# Patient Record
Sex: MALE | Race: WHITE | URBAN - METROPOLITAN AREA
[De-identification: names, ages, dates, MRNs, and addresses within clinical notes are randomized per-mention and may not be internally consistent; named-entity substitution may affect disease eponyms.]

---

## 2017-01-10 ENCOUNTER — ANESTHESIA EVENT (OUTPATIENT)
Dept: SURGERY | Facility: CLINIC | Age: 47
DRG: 489 | End: 2017-01-10
Payer: COMMERCIAL

## 2017-01-10 NOTE — ANESTHESIA PREPROCEDURE EVALUATION
Anesthesia Evaluation     . Pt has had prior anesthetic. Type: Regional    No history of anesthetic complications     ROS/MED HX    ENT/Pulmonary:  - neg pulmonary ROS     Neurologic:  - neg neurologic ROS     Cardiovascular:  - neg cardiovascular ROS   (+) ----. : . . . :. . No previous cardiac testing       METS/Exercise Tolerance:  >4 METS   Hematologic:  - neg hematologic  ROS       Musculoskeletal:  - neg musculoskeletal ROS       GI/Hepatic:  - neg GI/hepatic ROS       Renal/Genitourinary:  - ROS Renal section negative   (+) Pt has no history of transplant,       Endo:  - neg endo ROS       Psychiatric:     (+) psychiatric history anxiety      Infectious Disease:  - neg infectious disease ROS       Malignancy:      - no malignancy   Other:    (+) No chance of pregnancy C-spine cleared: N/A, no H/O Chronic Pain,no other significant disability              Physical Exam  Normal systems: cardiovascular, pulmonary and dental    Airway   Mallampati: I  TM distance: >3 FB  Neck ROM: full    Dental     Cardiovascular   Rhythm and rate: regular and normal      Pulmonary    breath sounds clear to auscultation                    Anesthesia Plan      History & Physical Review  History and physical reviewed and following examination; no interval change.    ASA Status:  2 .        Plan for General, LMA and Peripheral Nerve Block with Intravenous and Propofol induction. Maintenance will be Inhalation.    PONV prophylaxis:  Ondansetron (or other 5HT-3) and Dexamethasone or Solumedrol       Postoperative Care  Postoperative pain management:  Multi-modal analgesia and Peripheral nerve block (Single Shot).      Consents                          .

## 2017-01-11 ENCOUNTER — ANESTHESIA (OUTPATIENT)
Dept: SURGERY | Facility: CLINIC | Age: 47
DRG: 489 | End: 2017-01-11
Payer: COMMERCIAL

## 2017-01-11 ENCOUNTER — APPOINTMENT (OUTPATIENT)
Dept: GENERAL RADIOLOGY | Facility: CLINIC | Age: 47
DRG: 489 | End: 2017-01-11
Attending: ORTHOPAEDIC SURGERY
Payer: COMMERCIAL

## 2017-01-11 ENCOUNTER — SURGERY (OUTPATIENT)
Age: 47
End: 2017-01-11

## 2017-01-11 PROBLEM — M25.569 KNEE PAIN: Status: ACTIVE | Noted: 2017-01-11

## 2017-01-11 PROCEDURE — 40000278 XR SURGERY CARM FLUORO LESS THAN 5 MIN: Mod: TC

## 2017-01-11 PROCEDURE — C9290 INJ, BUPIVACAINE LIPOSOME: HCPCS

## 2017-01-11 PROCEDURE — 25000125 ZZHC RX 250: Performed by: NURSE ANESTHETIST, CERTIFIED REGISTERED

## 2017-01-11 PROCEDURE — 25800025 ZZH RX 258: Performed by: ANESTHESIOLOGY

## 2017-01-11 PROCEDURE — 25000125 ZZHC RX 250

## 2017-01-11 PROCEDURE — 25000125 ZZHC RX 250: Performed by: ORTHOPAEDIC SURGERY

## 2017-01-11 PROCEDURE — 25000128 H RX IP 250 OP 636

## 2017-01-11 RX ORDER — BUPIVACAINE HYDROCHLORIDE AND EPINEPHRINE 2.5; 5 MG/ML; UG/ML
INJECTION, SOLUTION INFILTRATION; PERINEURAL PRN
Status: DISCONTINUED | OUTPATIENT
Start: 2017-01-11 | End: 2017-01-11

## 2017-01-11 RX ORDER — KETOROLAC TROMETHAMINE 30 MG/ML
INJECTION, SOLUTION INTRAMUSCULAR; INTRAVENOUS PRN
Status: DISCONTINUED | OUTPATIENT
Start: 2017-01-11 | End: 2017-01-11

## 2017-01-11 RX ORDER — DEXAMETHASONE SODIUM PHOSPHATE 4 MG/ML
INJECTION, SOLUTION INTRA-ARTICULAR; INTRALESIONAL; INTRAMUSCULAR; INTRAVENOUS; SOFT TISSUE PRN
Status: DISCONTINUED | OUTPATIENT
Start: 2017-01-11 | End: 2017-01-11

## 2017-01-11 RX ORDER — ONDANSETRON 2 MG/ML
INJECTION INTRAMUSCULAR; INTRAVENOUS PRN
Status: DISCONTINUED | OUTPATIENT
Start: 2017-01-11 | End: 2017-01-11

## 2017-01-11 RX ORDER — EPHEDRINE SULFATE 50 MG/ML
INJECTION, SOLUTION INTRAMUSCULAR; INTRAVENOUS; SUBCUTANEOUS PRN
Status: DISCONTINUED | OUTPATIENT
Start: 2017-01-11 | End: 2017-01-11

## 2017-01-11 RX ORDER — LIDOCAINE HYDROCHLORIDE 20 MG/ML
INJECTION, SOLUTION INFILTRATION; PERINEURAL PRN
Status: DISCONTINUED | OUTPATIENT
Start: 2017-01-11 | End: 2017-01-11

## 2017-01-11 RX ORDER — PROPOFOL 10 MG/ML
INJECTION, EMULSION INTRAVENOUS PRN
Status: DISCONTINUED | OUTPATIENT
Start: 2017-01-11 | End: 2017-01-11

## 2017-01-11 RX ORDER — FENTANYL CITRATE 50 UG/ML
INJECTION, SOLUTION INTRAMUSCULAR; INTRAVENOUS PRN
Status: DISCONTINUED | OUTPATIENT
Start: 2017-01-11 | End: 2017-01-11

## 2017-01-11 RX ADMIN — SODIUM CHLORIDE, POTASSIUM CHLORIDE, SODIUM LACTATE AND CALCIUM CHLORIDE: 600; 310; 30; 20 INJECTION, SOLUTION INTRAVENOUS at 15:04

## 2017-01-11 RX ADMIN — DEXAMETHASONE SODIUM PHOSPHATE 6 MG: 4 INJECTION, SOLUTION INTRAMUSCULAR; INTRAVENOUS at 16:11

## 2017-01-11 RX ADMIN — FENTANYL CITRATE 75 MCG: 50 INJECTION, SOLUTION INTRAMUSCULAR; INTRAVENOUS at 15:11

## 2017-01-11 RX ADMIN — ONDANSETRON 4 MG: 2 INJECTION INTRAMUSCULAR; INTRAVENOUS at 16:11

## 2017-01-11 RX ADMIN — PROPOFOL 180 MG: 10 INJECTION, EMULSION INTRAVENOUS at 14:27

## 2017-01-11 RX ADMIN — BUPIVACAINE 15 ML: 13.3 INJECTION, SUSPENSION, LIPOSOMAL INFILTRATION at 12:32

## 2017-01-11 RX ADMIN — BUPIVACAINE HYDROCHLORIDE AND EPINEPHRINE BITARTRATE 10 ML: 2.5; .005 INJECTION, SOLUTION INFILTRATION; PERINEURAL at 12:32

## 2017-01-11 RX ADMIN — KETOROLAC TROMETHAMINE 30 MG: 30 INJECTION, SOLUTION INTRAMUSCULAR at 16:11

## 2017-01-11 RX ADMIN — CEFAZOLIN SODIUM 1 G: 2 INJECTION, SOLUTION INTRAVENOUS at 16:21

## 2017-01-11 RX ADMIN — LIDOCAINE HYDROCHLORIDE 60 MG: 20 INJECTION, SOLUTION INFILTRATION; PERINEURAL at 14:27

## 2017-01-11 RX ADMIN — FENTANYL CITRATE 50 MCG: 50 INJECTION, SOLUTION INTRAMUSCULAR; INTRAVENOUS at 14:27

## 2017-01-11 RX ADMIN — SODIUM CHLORIDE, POTASSIUM CHLORIDE, SODIUM LACTATE AND CALCIUM CHLORIDE: 600; 310; 30; 20 INJECTION, SOLUTION INTRAVENOUS at 14:18

## 2017-01-11 RX ADMIN — MIDAZOLAM HYDROCHLORIDE 2 MG: 1 INJECTION, SOLUTION INTRAMUSCULAR; INTRAVENOUS at 14:17

## 2017-01-11 RX ADMIN — CEFAZOLIN SODIUM 2 G: 2 INJECTION, SOLUTION INTRAVENOUS at 14:18

## 2017-01-11 RX ADMIN — BUPIVACAINE HYDROCHLORIDE AND EPINEPHRINE BITARTRATE 9 ML: 2.5; .005 INJECTION, SOLUTION INFILTRATION; PERINEURAL at 14:50

## 2017-01-11 RX ADMIN — FENTANYL CITRATE 50 MCG: 50 INJECTION, SOLUTION INTRAMUSCULAR; INTRAVENOUS at 16:47

## 2017-01-11 RX ADMIN — Medication 5 MG: at 15:28

## 2017-01-11 RX ADMIN — EPINEPHRINE 2000 ML: 1 INJECTION, SOLUTION, CONCENTRATE INTRAVENOUS at 15:00

## 2017-01-11 NOTE — ANESTHESIA POSTPROCEDURE EVALUATION
Patient: Jareth Hennessy    ARTHROSCOPY KNEE (Right Knee)  OSTEOTOMY TIBIA (Right Leg)  Additional InformationProcedure(s):  Right Knee Arthroscopy, Chondroplasy, Partial Lateral Meniscectomy, Proximal Tibial Osteotomy   - Wound Class: I-Clean   - Wound Class: I-Clean    Diagnosis:Right Knee Medial Compartment Osteoarthritis, Varus Kneee  Diagnosis Additional Information: No value filed.    Anesthesia Type:  General, LMA, Peripheral Nerve Block    Note:  Anesthesia Post Evaluation    Patient location during evaluation: PACU  Patient participation: Able to participate in evaluation but full recovery from regional anesthesia has not yet ocurrred but is anticipated to occur within 48 hours  Level of consciousness: awake and alert  Pain management: adequate  Airway patency: patent  Cardiovascular status: acceptable  Respiratory status: acceptable  Hydration status: acceptable  PONV: none     Anesthetic complications: None          Last vitals:  Filed Vitals:    01/11/17 1240 01/11/17 1245 01/11/17 1645   BP: 101/47 114/78 105/64   Pulse:      Temp:   36.9  C (98.4  F)   Resp: 13 13 16   SpO2: 100% 100% 100%       Electronically Signed By: Nkechi Merino MD  January 11, 2017  4:59 PM

## 2017-01-11 NOTE — ANESTHESIA CARE TRANSFER NOTE
Patient: Jareth Hennessy    ARTHROSCOPY KNEE (Right Knee)  OSTEOTOMY TIBIA (Right Leg)  Additional InformationProcedure(s):  Right Knee Arthroscopy, Chondroplasy, Partial Lateral Meniscectomy, Proximal Tibial Osteotomy   - Wound Class: I-Clean   - Wound Class: I-Clean    Diagnosis: Right Knee Medial Compartment Osteoarthritis, Varus Kneee  Diagnosis Additional Information: No value filed.    Anesthesia Type:   General, LMA, Peripheral Nerve Block     Note:  Airway :Face Mask  Patient transferred to:PACU  Comments: Report to RN & NAIN at 1649.  See Epic for vitals.      Vitals: (Last set prior to Anesthesia Care Transfer)              Electronically Signed By: VALENTIN Dixon CRNA  January 11, 2017  4:52 PM

## 2017-01-11 NOTE — ANESTHESIA PROCEDURE NOTES
Peripheral Nerve Block Procedure Note    Procedure Start/Stop TImes:      1/11/2017 12:27 PM     1/11/2017 12:35 PM    patient identified, IV checked, site marked, risks and benefits discussed, informed consent, monitors and equipment checked, pre-op evaluation, at physician/surgeon's request and post-op pain management      Correct Patient: Yes      Correct Position: Yes      Correct Site: Yes      Correct Procedure: Yes      Correct Laterality:  Yes    Site Marked:  Yes  Procedure details:     Procedure:  Femoral    Laterality:  Right    Position:  Supine    Sterile Prep: chloraprep, mask and sterile gloves      Local skin infiltration:  1% lidocaine    Needle gauge:  21    Needle length (mm):  50    Ultrasound: Yes      Ultrasound used to identify targeted nerve, plexus, or vascular structure and placed a needle adjacent to it      Permanent Image entered into patiient's record      Abnormal pain on injection: No      Blood Aspirated: No      Paresthesias:  No    Bleeding at site: No      Bolus via:  Needle    Infusion Method:  Single Shot    Complications:  None  Assessment/Narrative:      Discussed with Patient Off-Label use of Liposomal Bupivacaine (Exparel) for Nerve Block.    Relevant risks & benefits were discussed with patient.    All questions were answered and there was agreement to proceed.    Patient signed Off-Label Use of Exparel Consent Form.

## 2017-01-12 ENCOUNTER — APPOINTMENT (OUTPATIENT)
Dept: PHYSICAL THERAPY | Facility: CLINIC | Age: 47
DRG: 489 | End: 2017-01-12
Attending: ORTHOPAEDIC SURGERY
Payer: COMMERCIAL

## 2017-01-12 ENCOUNTER — APPOINTMENT (OUTPATIENT)
Dept: OCCUPATIONAL THERAPY | Facility: CLINIC | Age: 47
DRG: 489 | End: 2017-01-12
Attending: ORTHOPAEDIC SURGERY
Payer: COMMERCIAL

## 2017-02-10 ENCOUNTER — TRANSFERRED RECORDS (OUTPATIENT)
Dept: HEALTH INFORMATION MANAGEMENT | Facility: CLINIC | Age: 47
End: 2017-02-10

## 2017-02-15 ENCOUNTER — TELEPHONE (OUTPATIENT)
Dept: OPHTHALMOLOGY | Facility: CLINIC | Age: 47
End: 2017-02-15

## 2017-02-15 ENCOUNTER — OFFICE VISIT (OUTPATIENT)
Dept: OPHTHALMOLOGY | Facility: CLINIC | Age: 47
End: 2017-02-15
Payer: COMMERCIAL

## 2017-02-15 DIAGNOSIS — Q10.0 PTOSIS, CONGENITAL, LEFT: Primary | ICD-10-CM

## 2017-02-15 PROCEDURE — 92285 EXTERNAL OCULAR PHOTOGRAPHY: CPT | Performed by: OPHTHALMOLOGY

## 2017-02-15 PROCEDURE — 99203 OFFICE O/P NEW LOW 30 MIN: CPT | Performed by: OPHTHALMOLOGY

## 2017-02-15 RX ORDER — MAGNESIUM HYDROXIDE 400 MG/5ML
1 SUSPENSION, ORAL (FINAL DOSE FORM) ORAL DAILY
COMMUNITY
Start: 2014-04-08

## 2017-02-15 RX ORDER — TADALAFIL 20 MG/1
20 TABLET ORAL PRN
COMMUNITY
Start: 2014-12-15

## 2017-02-15 ASSESSMENT — LAGOPHTHALMOS
OS_LAGOPHTHALMOS: 0
OD_LAGOPHTHALMOS: 0

## 2017-02-15 ASSESSMENT — LEVATOR FUNCTION
OD_LEVATOR: 15
OS_LEVATOR: 9

## 2017-02-15 ASSESSMENT — MARGIN REFLEX DISTANCE
OD_MRD1: 4
OS_MRD1: 1

## 2017-02-15 ASSESSMENT — VISUAL ACUITY
METHOD: SNELLEN - LINEAR
OS_SC: 20/20
OD_SC: 20/20

## 2017-02-15 ASSESSMENT — SLIT LAMP EXAM - LIDS: COMMENTS: NORMAL

## 2017-02-15 NOTE — LETTER
Dear Dr. Lorenzo,    Thank you for referring Jareth Hennessy. As you know, he is a a very pleasant 46 year old with a history of left congenital ptosis. 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 He has mild dry eye symptoms, otherwise no diplopia, or pupil abnormality.    On examination he has moderate function congenital ptosis on the left with about 9 mm of levator function as compared to 15 mm on the right. Margin to reflex distance 1 is 4 mm on the right and 1 mm on the left. The left upper eyelid is higher on downgaze, which is consistent with congenital ptosis.     We discussed options, and plan to proceed with left upper eyelid ptosis repair at his convenience. We did discuss the risks associated with surgery, in particular repair of congenital ptosis is not as predictable as involutional ptosis, as the levator muscle is not quite normal in congenital ptosis. With that said, I think we can achieve improvement in his lid position, and hopefully good symmetry.     As always, thank you for referral. Please let me know if you have any questions or concerns.     Sincerely,    Carey Escobar MD    Oculoplastic and Orbital Surgery   Department of Ophthalmology and Visual Neurosciences  [facility]  Office: [phone]  [email or web address]

## 2017-02-15 NOTE — NURSING NOTE
Patient presents with:  Droopy Left Upper Lid: ptosis eval, rp Dr Lavelle Lorenzo, present since birth      Referring Provider:  [facility]       HPI    Last Eye Exam:  2/10/17   Informant(s):  self   Affected eye(s):  Left   Location:  Upper   Symptoms:     Blurred vision   Decreased vision   Distorted vision   No difficulty with reading         Do you have eye pain now?:  No

## 2017-02-15 NOTE — PROGRESS NOTES
Oculoplastic Clinic New Patient    Patient: Jareth Hennessy MRN# [id number]   YOB: 1970 Age: 46 year old   Date of Visit: Feb 15, 2017    CC: Droopy eyelids obstructing vision.  Chief Complaints and History of Present Illnesses   Patient presents with     Droopy Left Upper Lid     ptosis gwendolyn wiseman Dr, present since birth                 HPI:     Jareth Hennessy is a 46 year old male who has had a droopy left upper eyelid since birth. He does not believe there was any birth trauma. The eyelid has gradually dropped over the years. There is some variability, when he is very tired, or has a few beers it will drop more. The patient denies double vision, variability of the eyelid position. He does have mild dry eye symptoms. 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    EXAM:     MRD1: 4 right eye and 1 left eye   Moderate function ptosis    VISUAL FIELD:  Right eye untaped:60 degrees Right eye taped: not done  Left eye untaped:35 degrees Left eye taped:60 degrees  This is 25 degrees of improvement  Assessment & Plan     Jareth Hennessy is a 46 year old male with the following diagnoses:   1. Ptosis, congenital, left       Left ptosis repair MMCR 10+2      ANTICOAGULATION:  n/a       PHOTOS DEMONSTRATE:    Left moderate function cognenital ptosis with obstruction of superior visual field.         Attending Physician Attestation:  Complete documentation of historical and exam elements from today's encounter can be found in the full encounter summary report (not reduplicated in this progress note).  I personally obtained the chief complaint(s) and history of present illness.  I confirmed and edited as necessary the review of systems, past medical/surgical history, family history, social history, and examination findings as documented by others; and I examined the patient myself.  I personally reviewed the relevant tests, images, and reports as  documented above.  I formulated and edited as necessary the assessment and plan and discussed the findings and management plan with the patient and family. - Carey Escobar MD    Today with Jareth Hennessy, I reviewed the indications, risks, benefits, and alternatives of the proposed surgical procedure including, but not limited to, failure obtain the desired result  and need for additional surgery, bleeding, infection, loss of vision, loss of the eye, and the remote possibility of permanent damage to any organ system or death with the use of anesthesia.  I provided multiple opportunities for the questions, answered all questions to the best of my ability, and confirmed that my answers and my discussion were understood.

## 2017-02-15 NOTE — MR AVS SNAPSHOT
After Visit Summary   2/15/2017    Jareth Hennessy    MRN: [id number]           Patient Information     Date Of Birth          1970        Visit Information        Provider Department      2/15/2017 8:00 AM Carey Escobar MD [facility]        Today's Diagnoses     Ptosis, congenital, left    -  1       Follow-ups after your visit        Follow-up notes from your care team     Return for Surgery.      Who to contact     If you have questions or need follow up information about today's clinic visit or your schedule please contact [facility] directly at [phone].  Normal or non-critical lab and imaging results will be communicated to you by MyChart, letter or phone within 4 business days after the clinic has received the results. If you do not hear from us within 7 days, please contact the clinic through MyChart or phone. If you have a critical or abnormal lab result, we will notify you by phone as soon as possible.  Submit refill requests through [employer] or call your pharmacy and they will forward the refill request to us. Please allow 3 business days for your refill to be completed.          Additional Information About Your Visit        MyChart Information     [employer] is an electronic gateway that provides easy, online access to your medical records. With [employer], you can request a clinic appointment, read your test results, renew a prescription or communicate with your care team.     To sign up for [employer] visit the website at [email or web address]   You will be asked to enter the access code listed below, as well as some personal information. Please follow the directions to create your username and password.     Your access code is: [email or web address]  Expires: 2017  9:19 AM     Your access code will  in 90 days. If you need help or a new code, please contact your [facility] or call [phone] for  assistance.        Care EveryWhere ID     This is your Care EveryWhere ID. This could be used by other organizations to access your [facility] medical records  [id number]         Blood Pressure from Last 3 Encounters:   01/13/17 110/51    Weight from Last 3 Encounters:   01/11/17 85.1 kg (187 lb 9.8 oz)              We Performed the Following     External Photos OU (both eyes)     Kinetic Ptosis OU     [place]-Operative Worksheet (Plastics)        Primary Care Provider Office Phone # Fax #    Lopez Camara [phone]       [facility] [address] [address]        Thank you!     Thank you for choosing [facility]  for your care. Our goal is always to provide you with excellent care. Hearing back from our patients is one way we can continue to improve our services. Please take a few minutes to complete the written survey that you may receive in the mail after your visit with us. Thank you!             Your Updated Medication List - Protect others around you: Learn how to safely use, store and throw away your medicines at www.disposemymeds.org.          This list is accurate as of: 2/15/17  9:01 AM.  Always use your most recent med list.                   Brand Name Dispense Instructions for use    acetaminophen 325 MG tablet    TYLENOL    100 tablet    Take 2 tablets (650 mg) by mouth every 4 hours as needed for other (surgical pain)       glucosamine chondroitinoitin complex Caps      Take 1 tablet by mouth daily       hydrOXYzine 25 MG tablet    ATARAX    25 tablet    Take 1 tablet (25 mg) by mouth every 6 hours as needed for itching       tadalafil 20 MG tablet    CIALIS     Take 20 mg by mouth as needed

## 2017-02-22 NOTE — TELEPHONE ENCOUNTER
[facility] requested color photos be mailed before they can approve Jareth's upcoming surgery. Photos mailed 2-22-17. Request sent to [facility] to be scanned.  Felisha Farrar, Surgery Scheduling Coordinator

## 2017-02-27 NOTE — TELEPHONE ENCOUNTER
Procedure:  left upper eyelid ptosis repair 18343  Facility: [facility], [facility] or Other [facility] [place] [place]  Length: 30 minute(s)  Surgeon:Carey Escobar MD  Anesthesia: Local with MAC  Diagnosis: Congenital Brow Ptosis  Out Patient or AM admit:  (Same day)  BMI:Estimated body mass index is 25.44 kg/(m^2) as calculated from the following:    Height as of 1/11/17: 1.829 m (6').    Weight as of 1/11/17: 85.1 kg (187 lb 9.8 oz). (If over 43 for general or 45 for MAC cannot be scheduled at [facility])   Pre-op Appointments needed: History & Physical within 30 days of surgery  Post-op appointments needed: Brow Ptosis 1-3  week   needed:No   Surgery packet/instructions given to patient?:  Yes  Pre op teaching done:  Yes  Lens Orders Needed: No   Referring provider: Dr Lupillo Lorenzo  Special Considerations:

## 2017-03-03 NOTE — TELEPHONE ENCOUNTER
[facility]    Phone Message    Name of Caller: Yuli    Phone Number: Cell number on file:    Telephone Information:   Mobile [phone]       Best time to return call: any    May a detailed message be left on voicemail: yes    Relation to patient: Self    Reason for Call: Other: now that insurance has approved surgery, pt would like to chat with staff about surgery.     Action Taken: Message routed to:  Adult Clinics: Eye p [id number]

## 2017-03-08 ENCOUNTER — OFFICE VISIT (OUTPATIENT)
Dept: OPHTHALMOLOGY | Facility: CLINIC | Age: 47
End: 2017-03-08
Payer: COMMERCIAL

## 2017-03-08 DIAGNOSIS — Q10.0 PTOSIS, CONGENITAL, LEFT: Primary | ICD-10-CM

## 2017-03-08 PROCEDURE — 99212 OFFICE O/P EST SF 10 MIN: CPT | Performed by: OPHTHALMOLOGY

## 2017-03-08 ASSESSMENT — LEVATOR FUNCTION: OS_LEVATOR: 8-9

## 2017-03-08 ASSESSMENT — SLIT LAMP EXAM - LIDS: COMMENTS: NORMAL

## 2017-03-08 ASSESSMENT — MARGIN REFLEX DISTANCE
OD_MRD1: 4
OS_MRD1: 1

## 2017-03-08 NOTE — MR AVS SNAPSHOT
After Visit Summary   3/8/2017    Jareth Hennessy    MRN: [id number]           Patient Information     Date Of Birth          1970        Visit Information        Provider Department      3/8/2017 11:45 AM Carey Escobar MD [facility]        Today's Diagnoses     Ptosis, congenital, left    -  1       Follow-ups after your visit        Your next 10 appointments already scheduled     Mar 13, 2017   Procedure with Carey Escobar MD   [facility]    [address]  [facility] [address] [phone]              Who to contact     If you have questions or need follow up information about today's clinic visit or your schedule please contact [facility] directly at [phone].  Normal or non-critical lab and imaging results will be communicated to you by MyChart, letter or phone within 4 business days after the clinic has received the results. If you do not hear from us within 7 days, please contact the clinic through MyChart or phone. If you have a critical or abnormal lab result, we will notify you by phone as soon as possible.  Submit refill requests through [employer] or call your pharmacy and they will forward the refill request to us. Please allow 3 business days for your refill to be completed.          Additional Information About Your Visit        MyChart Information     [employer] is an electronic gateway that provides easy, online access to your medical records. With [employer], you can request a clinic appointment, read your test results, renew a prescription or communicate with your care team.     To sign up for [employer] visit the website at [email or web address]   You will be asked to enter the access code listed below, as well as some personal information. Please follow the directions to create your username and password.     Your access code is: [email or web address]  Expires: 4/13/2017  9:19 AM     Your access code will   in 90 days. If you need help or a new code, please contact your [facility] or call [phone] for assistance.        Care EveryWhere ID     This is your Care EveryWhere ID. This could be used by other organizations to access your [facility] medical records  [id number]         Blood Pressure from Last 3 Encounters:   17 110/51    Weight from Last 3 Encounters:   17 85.1 kg (187 lb 9.8 oz)              Today, you had the following     No orders found for display       Primary Care Provider Office Phone # Fax #    Lopez Camara [phone]       [facility] [address] [address]        Thank you!     Thank you for choosing [facility]  for your care. Our goal is always to provide you with excellent care. Hearing back from our patients is one way we can continue to improve our services. Please take a few minutes to complete the written survey that you may receive in the mail after your visit with us. Thank you!             Your Updated Medication List - Protect others around you: Learn how to safely use, store and throw away your medicines at www.disposemymeds.org.          This list is accurate as of: 3/8/17  1:20 PM.  Always use your most recent med list.                   Brand Name Dispense Instructions for use    acetaminophen 325 MG tablet    TYLENOL    100 tablet    Take 2 tablets (650 mg) by mouth every 4 hours as needed for other (surgical pain)       glucosamine chondroitinoitin complex Caps      Take 1 tablet by mouth daily       hydrOXYzine 25 MG tablet    ATARAX    25 tablet    Take 1 tablet (25 mg) by mouth every 6 hours as needed for itching       tadalafil 20 MG tablet    CIALIS     Take 20 mg by mouth as needed

## 2017-03-08 NOTE — PROGRESS NOTES
"Chief Complaints and History of Present Illnesses   Patient presents with     Eval/Assessment     would like to rediscuss the proposted Left ptosis repair MMCR 10+2, approved by [employer]     Jareth Hennessy is a 46 year old gentleman I first met 2/15/2017 for ptosis evaluation. We obtained prior authorization for surgical management of his congenital ptosis, and he wanted to discuss the options further - he has questions about the goal, the operative day, \"best possible outcomes\" and \"worst possibilities\" associated with surgery    We discussed the procedure again and he would like to proceed. We also discussed the goal, the fact that his levator function on the affected side is decreased, as such if I can get him to be symmetric in primary, he may not be symmetric in other directions of gaze. We also discussed risks including overcorrection, undercorrection, contour irregularities, worsening dry eye, suture irritation, and need for more surgery.          Assessment & Plan     Jareth Hennessy is a 46 year old male with the following diagnoses:   1. Ptosis, congenital, left       Proceed with left broussard muscle conjunctival resection  10+2             Complete documentation of historical and exam elements from today's encounter can be found in the full encounter summary report (not reduplicated in this progress note). I personally obtained the chief complaint(s) and history of present illness.  I confirmed and edited as necessary the review of systems, past medical/surgical history, family history, social history, and examination findings as documented by others; and I examined the patient myself. I personally reviewed the relevant tests, images, and reports as documented above. I formulated and edited as necessary the assessment and plan and discussed the findings and management plan with the patient and family. - Carey Escobar MD      "

## 2017-03-12 ENCOUNTER — ANESTHESIA EVENT (OUTPATIENT)
Dept: SURGERY | Facility: AMBULATORY SURGERY CENTER | Age: 47
End: 2017-03-12

## 2017-03-12 NOTE — ANESTHESIA PREPROCEDURE EVALUATION
Physical Exam  Normal systems: cardiovascular, pulmonary and dental    Airway   Mallampati: I  TM distance: >3 FB  Neck ROM: full    Dental     Cardiovascular   Rhythm and rate: regular and normal      Pulmonary    breath sounds clear to auscultation                    Anesthesia Plan      History & Physical Review  History and physical reviewed and following examination; no interval change.    ASA Status:  2 .    NPO Status:  > 6 hours    Plan for MAC with Propofol and Intravenous induction. Maintenance will be TIVA.  Reason for MAC:  Procedure to face, neck, head or breast  PONV prophylaxis:  Ondansetron (or other 5HT-3) and Dexamethasone or Solumedrol       Postoperative Care  Postoperative pain management:  IV analgesics, Oral pain medications and Multi-modal analgesia.      Consents  Anesthetic plan, risks, benefits and alternatives discussed with:  Patient..          ANESTHESIA PREOP EVALUATION    PROCEDURE: Procedure(s):  Left upper eyelid ptosis repair - Wound Class:     HPI: Jareth Hennessy is a 46 year old male who presents for above procedure.    PAST MEDICAL HISTORY:    Past Medical History   Diagnosis Date     Glaucoma suspect of both eyes 02/10/2017     [facility]       PAST SURGICAL HISTORY:    Past Surgical History   Procedure Laterality Date     Orthopedic surgery       Knee surgery       Arthroscopy knee Right 1/11/2017     Procedure: ARTHROSCOPY KNEE;  Surgeon: Tavo Mahmood MD;  Location: [facility] OR     Osteotomy tibia Right 1/11/2017     Procedure: OSTEOTOMY TIBIA;  Surgeon: Tavo Mahmood MD;  Location: [facility] OR       PAST ANESTHESIA HISTORY:     No personal or family h/o anesthesia problems    SOCIAL HISTORY:       Social History   Substance Use Topics     Smoking status: Never Smoker     Smokeless tobacco: Not on file     Alcohol use Yes      Comment: 2x/week       ALLERGIES:     No Known Allergies    MEDICATIONS:       (Not in a hospital  admission)    Current Outpatient Prescriptions   Medication Sig Dispense Refill     Glucosamine-Chondroit-Vit C-Mn (GLUCOSAMINE CHONDROITINOITIN COMPLEX) CAPS Take 1 tablet by mouth daily       tadalafil (CIALIS) 20 MG tablet Take 20 mg by mouth as needed       acetaminophen (TYLENOL) 325 MG tablet Take 2 tablets (650 mg) by mouth every 4 hours as needed for other (surgical pain) 100 tablet 0     hydrOXYzine (ATARAX) 25 MG tablet Take 1 tablet (25 mg) by mouth every 6 hours as needed for itching 25 tablet 0       Current Outpatient Prescriptions Ordered in [facility]   Medication Sig Dispense Refill     Glucosamine-Chondroit-Vit C-Mn (GLUCOSAMINE CHONDROITINOITIN COMPLEX) CAPS Take 1 tablet by mouth daily       tadalafil (CIALIS) 20 MG tablet Take 20 mg by mouth as needed       acetaminophen (TYLENOL) 325 MG tablet Take 2 tablets (650 mg) by mouth every 4 hours as needed for other (surgical pain) 100 tablet 0     hydrOXYzine (ATARAX) 25 MG tablet Take 1 tablet (25 mg) by mouth every 6 hours as needed for itching 25 tablet 0     No current Epic-ordered facility-administered medications on file.        PHYSICAL EXAM:    Vitals: T Data Unavailable, P Data Unavailable, BP Data Unavailable, R Data Unavailable, SpO2  , Weight Wt Readings from Last 2 Encounters:   01/11/17 85.1 kg (187 lb 9.8 oz)       See doc flowsheet      No Data Recorded    No Data Recorded    No Data Recorded    No Data Recorded    No Data Recorded    No data recorded.  No data recorded.      NPO STATUS: see doc flowsheet    LABS:    BMP:  No results for input(s): NA, POTASSIUM, CHLORIDE, CO2, BUN, CR, GLC, YINKA in the last 88037 hours.    LFTs:   No results for input(s): PROTTOTAL, ALBUMIN, BILITOTAL, ALKPHOS, AST, ALT, BILIDIRECT in the last 22789 hours.    CBC:   Recent Labs   Lab Test  01/13/17   0555   HGB  12.1*       Coags:  No results for input(s): INR, PTT, FIBR in the last 08655 hours.    Imaging:  No orders to display       Braulio Palmerzuleima  MD  Anesthesiology Staff  Pager [phone]    3/12/2017  1:35 PM

## 2017-03-13 ENCOUNTER — HOSPITAL ENCOUNTER (OUTPATIENT)
Facility: AMBULATORY SURGERY CENTER | Age: 47
Discharge: HOME | End: 2017-03-13
Attending: OPHTHALMOLOGY | Admitting: OPHTHALMOLOGY
Payer: COMMERCIAL

## 2017-03-13 ENCOUNTER — ANESTHESIA (OUTPATIENT)
Dept: SURGERY | Facility: AMBULATORY SURGERY CENTER | Age: 47
End: 2017-03-13

## 2017-03-13 ENCOUNTER — TELEPHONE (OUTPATIENT)
Dept: OPHTHALMOLOGY | Facility: CLINIC | Age: 47
End: 2017-03-13

## 2017-03-13 ENCOUNTER — SURGERY (OUTPATIENT)
Age: 47
End: 2017-03-13
Payer: COMMERCIAL

## 2017-03-13 VITALS
RESPIRATION RATE: 16 BRPM | DIASTOLIC BLOOD PRESSURE: 60 MMHG | SYSTOLIC BLOOD PRESSURE: 105 MMHG | TEMPERATURE: 97.8 F | OXYGEN SATURATION: 98 % | HEIGHT: 72 IN

## 2017-03-13 DIAGNOSIS — Z98.890 POSTOPERATIVE EYE STATE: Primary | ICD-10-CM

## 2017-03-13 PROCEDURE — G8918 PT W/O PREOP ORDER IV AB PRO: HCPCS

## 2017-03-13 PROCEDURE — 67908 REPAIR EYELID DEFECT: CPT | Mod: LT | Performed by: OPHTHALMOLOGY

## 2017-03-13 PROCEDURE — G8907 PT DOC NO EVENTS ON DISCHARG: HCPCS

## 2017-03-13 PROCEDURE — 67908 REPAIR EYELID DEFECT: CPT

## 2017-03-13 RX ORDER — FENTANYL CITRATE 50 UG/ML
INJECTION, SOLUTION INTRAMUSCULAR; INTRAVENOUS PRN
Status: DISCONTINUED | OUTPATIENT
Start: 2017-03-13 | End: 2017-03-13

## 2017-03-13 RX ORDER — ERYTHROMYCIN 5 MG/G
OINTMENT OPHTHALMIC PRN
Status: DISCONTINUED | OUTPATIENT
Start: 2017-03-13 | End: 2017-03-13 | Stop reason: HOSPADM

## 2017-03-13 RX ORDER — MEPERIDINE HYDROCHLORIDE 25 MG/ML
12.5 INJECTION INTRAMUSCULAR; INTRAVENOUS; SUBCUTANEOUS
Status: DISCONTINUED | OUTPATIENT
Start: 2017-03-13 | End: 2017-03-14 | Stop reason: HOSPADM

## 2017-03-13 RX ORDER — NEOMYCIN SULFATE, POLYMYXIN B SULFATE AND DEXAMETHASONE 3.5; 10000; 1 MG/ML; [USP'U]/ML; MG/ML
1 SUSPENSION/ DROPS OPHTHALMIC 3 TIMES DAILY
Qty: 1 BOTTLE | Refills: 0 | Status: SHIPPED | OUTPATIENT
Start: 2017-03-13 | End: 2017-06-14

## 2017-03-13 RX ORDER — LIDOCAINE 40 MG/G
CREAM TOPICAL
Status: DISCONTINUED | OUTPATIENT
Start: 2017-03-13 | End: 2017-03-14 | Stop reason: HOSPADM

## 2017-03-13 RX ORDER — ONDANSETRON 4 MG/1
4 TABLET, ORALLY DISINTEGRATING ORAL EVERY 30 MIN PRN
Status: DISCONTINUED | OUTPATIENT
Start: 2017-03-13 | End: 2017-03-14 | Stop reason: HOSPADM

## 2017-03-13 RX ORDER — DEXAMETHASONE SODIUM PHOSPHATE 4 MG/ML
INJECTION, SOLUTION INTRA-ARTICULAR; INTRALESIONAL; INTRAMUSCULAR; INTRAVENOUS; SOFT TISSUE PRN
Status: DISCONTINUED | OUTPATIENT
Start: 2017-03-13 | End: 2017-03-13

## 2017-03-13 RX ORDER — ONDANSETRON 2 MG/ML
4 INJECTION INTRAMUSCULAR; INTRAVENOUS EVERY 30 MIN PRN
Status: DISCONTINUED | OUTPATIENT
Start: 2017-03-13 | End: 2017-03-14 | Stop reason: HOSPADM

## 2017-03-13 RX ORDER — HYDROMORPHONE HYDROCHLORIDE 1 MG/ML
.3-.5 INJECTION, SOLUTION INTRAMUSCULAR; INTRAVENOUS; SUBCUTANEOUS EVERY 10 MIN PRN
Status: DISCONTINUED | OUTPATIENT
Start: 2017-03-13 | End: 2017-03-14 | Stop reason: HOSPADM

## 2017-03-13 RX ORDER — HYDROCODONE BITARTRATE AND ACETAMINOPHEN 5; 325 MG/1; MG/1
1 TABLET ORAL
Status: CANCELLED | OUTPATIENT
Start: 2017-03-13

## 2017-03-13 RX ORDER — NALOXONE HYDROCHLORIDE 0.4 MG/ML
.1-.4 INJECTION, SOLUTION INTRAMUSCULAR; INTRAVENOUS; SUBCUTANEOUS
Status: DISCONTINUED | OUTPATIENT
Start: 2017-03-13 | End: 2017-03-14 | Stop reason: HOSPADM

## 2017-03-13 RX ORDER — LIDOCAINE HYDROCHLORIDE 20 MG/ML
INJECTION, SOLUTION INFILTRATION; PERINEURAL PRN
Status: DISCONTINUED | OUTPATIENT
Start: 2017-03-13 | End: 2017-03-13

## 2017-03-13 RX ORDER — SODIUM CHLORIDE, SODIUM LACTATE, POTASSIUM CHLORIDE, CALCIUM CHLORIDE 600; 310; 30; 20 MG/100ML; MG/100ML; MG/100ML; MG/100ML
INJECTION, SOLUTION INTRAVENOUS CONTINUOUS
Status: DISCONTINUED | OUTPATIENT
Start: 2017-03-13 | End: 2017-03-14 | Stop reason: HOSPADM

## 2017-03-13 RX ORDER — FENTANYL CITRATE 50 UG/ML
25-50 INJECTION, SOLUTION INTRAMUSCULAR; INTRAVENOUS
Status: DISCONTINUED | OUTPATIENT
Start: 2017-03-13 | End: 2017-03-14 | Stop reason: HOSPADM

## 2017-03-13 RX ORDER — ACETAMINOPHEN 325 MG/1
975 TABLET ORAL ONCE
Status: COMPLETED | OUTPATIENT
Start: 2017-03-13 | End: 2017-03-13

## 2017-03-13 RX ORDER — PROPOFOL 10 MG/ML
INJECTION, EMULSION INTRAVENOUS PRN
Status: DISCONTINUED | OUTPATIENT
Start: 2017-03-13 | End: 2017-03-13

## 2017-03-13 RX ORDER — ERYTHROMYCIN 5 MG/G
OINTMENT OPHTHALMIC
Qty: 3.5 G | Refills: 0 | Status: SHIPPED | OUTPATIENT
Start: 2017-03-13 | End: 2017-06-14

## 2017-03-13 RX ORDER — GABAPENTIN 300 MG/1
300 CAPSULE ORAL ONCE
Status: COMPLETED | OUTPATIENT
Start: 2017-03-13 | End: 2017-03-13

## 2017-03-13 RX ORDER — TETRACAINE HYDROCHLORIDE 5 MG/ML
SOLUTION OPHTHALMIC PRN
Status: DISCONTINUED | OUTPATIENT
Start: 2017-03-13 | End: 2017-03-13 | Stop reason: HOSPADM

## 2017-03-13 RX ORDER — ONDANSETRON 2 MG/ML
INJECTION INTRAMUSCULAR; INTRAVENOUS PRN
Status: DISCONTINUED | OUTPATIENT
Start: 2017-03-13 | End: 2017-03-13

## 2017-03-13 RX ORDER — HYDROCODONE BITARTRATE AND ACETAMINOPHEN 5; 325 MG/1; MG/1
1 TABLET ORAL EVERY 6 HOURS PRN
Qty: 10 TABLET | Refills: 0 | Status: SHIPPED | OUTPATIENT
Start: 2017-03-13

## 2017-03-13 RX ADMIN — PROPOFOL 50 MG: 10 INJECTION, EMULSION INTRAVENOUS at 07:45

## 2017-03-13 RX ADMIN — LIDOCAINE HYDROCHLORIDE 40 MG: 20 INJECTION, SOLUTION INFILTRATION; PERINEURAL at 07:37

## 2017-03-13 RX ADMIN — FENTANYL CITRATE 50 MCG: 50 INJECTION, SOLUTION INTRAMUSCULAR; INTRAVENOUS at 07:34

## 2017-03-13 RX ADMIN — PROPOFOL 150 MG: 10 INJECTION, EMULSION INTRAVENOUS at 07:37

## 2017-03-13 RX ADMIN — ONDANSETRON 4 MG: 2 INJECTION INTRAMUSCULAR; INTRAVENOUS at 07:48

## 2017-03-13 RX ADMIN — ERYTHROMYCIN 2 G: 5 OINTMENT OPHTHALMIC at 07:50

## 2017-03-13 RX ADMIN — GABAPENTIN 300 MG: 300 CAPSULE ORAL at 07:06

## 2017-03-13 RX ADMIN — ACETAMINOPHEN 975 MG: 325 TABLET ORAL at 07:05

## 2017-03-13 RX ADMIN — SODIUM CHLORIDE, SODIUM LACTATE, POTASSIUM CHLORIDE, CALCIUM CHLORIDE: 600; 310; 30; 20 INJECTION, SOLUTION INTRAVENOUS at 07:06

## 2017-03-13 RX ADMIN — Medication 1.5 ML: at 07:36

## 2017-03-13 RX ADMIN — TETRACAINE HYDROCHLORIDE 1 DROP: 5 SOLUTION OPHTHALMIC at 07:33

## 2017-03-13 RX ADMIN — DEXAMETHASONE SODIUM PHOSPHATE 4 MG: 4 INJECTION, SOLUTION INTRA-ARTICULAR; INTRALESIONAL; INTRAMUSCULAR; INTRAVENOUS; SOFT TISSUE at 07:45

## 2017-03-13 NOTE — ANESTHESIA POSTPROCEDURE EVALUATION
Patient: Jareth Hennessy    Procedure(s):  Left upper eyelid ptosis repair - Wound Class: I-Clean    Diagnosis:Left congenital ptosis  Diagnosis Additional Information: No value filed.    Anesthesia Type:  MAC    Note:  Anesthesia Post Evaluation    Patient location during evaluation: Phase 2  Patient participation: Able to fully participate in evaluation  Level of consciousness: awake and alert  Pain management: adequate  Airway patency: patent  Cardiovascular status: acceptable  Respiratory status: acceptable  Hydration status: acceptable  PONV: none     Anesthetic complications: None          Last vitals:  Vitals:    03/13/17 0701 03/13/17 0755 03/13/17 0810   BP: 112/75 100/58 105/60   Resp: 16 16 16   Temp: 97.9  F (36.6  C) 97.8  F (36.6  C)    SpO2: 98% 98% 98%         Electronically Signed By: Braulio Jhaveri MD  March 13, 2017  11:41 AM

## 2017-03-13 NOTE — IP AVS SNAPSHOT
MRN:[id number]                      After Visit Summary   3/13/2017    Jareth Hennessy    MRN: [id number]           Thank you!     Thank you for choosing [facility] for your care. Our goal is always to provide you with excellent care. Hearing back from our patients is one way we can continue to improve our services. Please take a few minutes to complete the written survey that you may receive in the mail after you visit with us. Thank you!        Patient Information     Date Of Birth          1970        About your hospital stay     You were admitted on:  March 13, 2017 You last received care in the:  [facility]    You were discharged on:  March 13, 2017       Who to Call     For medical emergencies, please call 911.  For non-urgent questions about your medical care, please call your primary care provider or clinic, [phone]  For questions related to your surgery, please call your surgery clinic        Attending Provider     Provider Carey Horta MD Ophthalmology       Primary Care Provider Office Phone # Fax #    [facility] [phone]       [address] [address]        After Care Instructions     Discharge Medication Instructions       Do NOT take aspirin or medications containing NSAIDS for 72 hours after procedure.            Ice to affected area       Apply cold pack for 15 minutes on, 15 minutes off, for 48 hours while awake.                  Further instructions from your care team       Post-operative Instructions  Ophthalmic Plastic and Reconstructive Surgery    Carey Escobar M.D.     All instructions apply to the operated eye(s) or eyelid(s).    Wound care and personal care  ? If a patch or bandage has been placed, please leave this in place until seen by your physician. Ensure that the bandage does not get wet when you take a shower.  ? Apply ice compresses 15 minutes on 15 minutes off  while awake for 2 days, then switch to warm water compresses 4 times a day until seen by your physician. For warm packs you can place a cup of dry uncooked rice in a clean cotton sock. Then place sock in microwave 30 seconds to one minute. Next place the warm sock into a plastic bag and wrap the bag with clean warm wet washcloth and place over operated eye.    ? You may shower or wash your hair the day after surgery. Do not bathe or go swimming for 1 week to prevent contamination of your wounds.  ? Do not apply make-up to the eyes or eyelids for 2 weeks after surgery.  ? Expect some swelling, bruising, black eye (even into the lower eyelids and cheeks). Also expect serum caking, crusting and discharge from the eye and/or incisions. A small amount of surface bleeding is normal for the first 48 hours.  ? Your eye(s) and eyelid(s) may be painful and tender. This is normal after surgery.  Use the pain medication as prescribed. If your pain does not improve despite the  medication, contact the office.    Contact information and follow-up  ? Return to the Eye Clinic for a follow-up appointment with your physician as  scheduled. If no appointment has been scheduled:   - [facility] eye clinic: [phone] for an appointment with Dr. Escobar within 1 to 2 weeks from your date of surgery.   -  [facility] eye clinic: [phone] for an appointment with Dr. Escobar within 1 to 2 weeks from your date of surgery.     ? For severe pain, bleeding, or loss of vision, call the [facility] at [phone] or [facility] at [phone].     After hours or on weekends and holidays, call [phone] and ask to speak with the ophthalmologist on call.    An on call person can be reached after hours for concerns. The on call doctor should not call in medication refill requests after hours or on weekends, so please plan accordingly. An effort has been made to provide  adequate pain medications following every surgery, and refills will not be provided in most instances. Narcotic pain medications cannot be called in.     Activity restrictions and driving  ? Avoid heavy lifting, bending, exercise or strenuous activity for 1 week after surgery.  You may resume other activities and return to work as tolerated.  ? You may not resume driving until have you stopped using narcotic pain medications (such as Norco, Percocet, Tylenol #3).    Medications  ? Restart all your regular home medications and eye drops. If you take Plavix or  Aspirin on a regular basis, wait for 72 hours after your surgery before restarting these in order to decrease the risk of bleeding complications.  ? Avoid aspirin and aspirin-like medications (Motrin, Aleve, Ibuprofen, [place]-  [place] etc) for 72 hours to reduce the risk of bleeding. You may take Tylenol  (acetaminophen) for pain.  ? In addition to your home medications, take the following post-operative medications as prescribed by your physician.    ? Apply antibiotic ointment to the suture on the left upper eyelid, and a half inch strip into the operated eye at night.  ? Instill eye drops 3 times a day for 10 days.   ? Take pain pills at prescribed frequency as needed for pain.    ? WARNING: All the prescription pain medications listed above contain Tylenol  (acetaminophen). You must not take more than 4,000 mg of acetaminophen per  24-hour period. This is equivalent to 6 tablets of Darvocet, 12 tablets of Norco, Percocet or Tylenol #3. If you take other over-the-counter medications containing acetaminophen, you must take the amount of acetaminophen into account and reduce the number of prescribed pain pills accordingly.  ? The prescribed medications may make you drowsy. You must not drive a car,  operate heavy machinery or drink alcohol while taking them.  ? The prescribed pain medications may cause constipation and nausea. Take them  with some food to  prevent a stomach upset. If you continue to experience nausea,  call your physician.    [facility]  Same-Day Surgery   Adult Discharge Orders & Instructions   For 24 hours after surgery  1. Get plenty of rest.  A responsible adult must stay with you for at least 24 hours after you leave the hospital.   2. Do not drive or use heavy equipment.  If you have weakness or tingling, don't drive or use heavy equipment until this feeling goes away.  3. Do not drink alcohol.  4. Avoid strenuous or risky activities.  Ask for help when climbing stairs.   5. You may feel lightheaded.  IF so, sit for a few minutes before standing.  Have someone help you get up.   6. If you have nausea (feel sick to your stomach): Drink only clear liquids such as apple juice, ginger ale, broth or 7-Up.  Rest may also help.  Be sure to drink enough fluids.  Move to a regular diet as you feel able.  7. You may have a slight fever. Call the doctor if your fever is over 100 F (37.7 C) (taken under the tongue) or lasts longer than 24 hours.  8. You may have a dry mouth, a sore throat, muscle aches or trouble sleeping.  These should go away after 24 hours.  9. Do not make important or legal decisions.   Call your doctor for any of the followin.  Signs of infection (fever, growing tenderness at the surgery site, a large amount of drainage or bleeding, severe pain, foul-smelling drainage, redness, swelling).    2. It has been over 8 to 10 hours since surgery and you are still not able to urinate (pass water).    3.  Headache for over 24 hours.    4.  Numbness, tingling or weakness the day after surgery (if you had spinal anesthesia).  To contact a doctor, call ____[phone]_______________________     Pending Results     No orders found from 3/11/2017 to 3/14/2017.            Admission Information     Date & Time Provider Department Dept. Phone    3/13/2017 Carey Escobar MD [facility] [phone]     "  Your Vitals Were     Blood Pressure Temperature Respirations Height Pulse Oximetry       100/58 97.8  F (36.6  C) (Temporal) 16 1.829 m (6') 98%       MyChart Information     [facility] lets you send messages to your doctor, view your test results, renew your prescriptions, schedule appointments and more. To sign up, go to [email or web address] . Click on \"Log in\" on the left side of the screen, which will take you to the Welcome page. Then click on \"Sign up Now\" on the right side of the page.     You will be asked to enter the access code listed below, as well as some personal information. Please follow the directions to create your username and password.     Your access code is: [email or web address]  Expires: 2017 10:19 AM     Your access code will  in 90 days. If you need help or a new code, please call your [facility] clinic or [phone].        Care EveryWhere ID     This is your Care EveryWhere ID. This could be used by other organizations to access your [facility] medical records  [id number]           Review of your medicines      START taking        Dose / Directions    erythromycin ophthalmic ointment   Commonly known as:  ROMYCIN   Used for:  Postoperative eye state        Apply small amount to left upper eyelid suture and into operated eye at bedtime.   Quantity:  3.5 g   Refills:  0       HYDROcodone-acetaminophen 5-325 MG per tablet   Commonly known as:  NORCO   Used for:  Postoperative eye state        Dose:  1 tablet   Take 1 tablet by mouth every 6 hours as needed for pain Maximum of 4000 mg of acetaminophen in 24 hours.   Quantity:  10 tablet   Refills:  0       neomycin-polymyxin-dexamethasone 3.5-16473-4.1 Susp ophthalmic susp   Commonly known as:  MAXITROL   Used for:  Postoperative eye state        Dose:  1 drop   Place 1 drop Into the left eye 3 times daily   Quantity:  1 Bottle   Refills:  0         CONTINUE these medicines which have NOT CHANGED        Dose / Directions    acetaminophen " 325 MG tablet   Commonly known as:  TYLENOL   Used for:  Status post osteotomy        Dose:  650 mg   Take 2 tablets (650 mg) by mouth every 4 hours as needed for other (surgical pain)   Quantity:  100 tablet   Refills:  0       glucosamine chondroitinoitin complex Caps        Dose:  1 tablet   Take 1 tablet by mouth daily   Refills:  0       hydrOXYzine 25 MG tablet   Commonly known as:  ATARAX   Used for:  Status post osteotomy        Dose:  25 mg   Take 1 tablet (25 mg) by mouth every 6 hours as needed for itching   Quantity:  25 tablet   Refills:  0       tadalafil 20 MG tablet   Commonly known as:  CIALIS        Dose:  20 mg   Take 20 mg by mouth as needed   Refills:  0            Where to get your medicines      These medications were sent to [facility] - [place], [place] - [address], [address]  [address], [address], [facility] [address]     Phone:  [phone]     erythromycin ophthalmic ointment    neomycin-polymyxin-dexamethasone 3.5-23731-7.1 Susp ophthalmic susp         Some of these will need a paper prescription and others can be bought over the counter. Ask your nurse if you have questions.     Bring a paper prescription for each of these medications     HYDROcodone-acetaminophen 5-325 MG per tablet                Protect others around you: Learn how to safely use, store and throw away your medicines at www.disposemymeds.org.             Medication List: This is a list of all your medications and when to take them. Check marks below indicate your daily home schedule. Keep this list as a reference.      Medications           Morning Afternoon Evening Bedtime As Needed    acetaminophen 325 MG tablet   Commonly known as:  TYLENOL   Take 2 tablets (650 mg) by mouth every 4 hours as needed for other (surgical pain)   Last time this was given:  975 mg on 3/13/2017  7:05 AM                                erythromycin ophthalmic ointment   Commonly known as:  ROMYCIN    Apply small amount to left upper eyelid suture and into operated eye at bedtime.   Last time this was given:  2 g on 3/13/2017  7:50 AM                                glucosamine chondroitinoitin complex Caps   Take 1 tablet by mouth daily                                HYDROcodone-acetaminophen 5-325 MG per tablet   Commonly known as:  NORCO   Take 1 tablet by mouth every 6 hours as needed for pain Maximum of 4000 mg of acetaminophen in 24 hours.                                hydrOXYzine 25 MG tablet   Commonly known as:  ATARAX   Take 1 tablet (25 mg) by mouth every 6 hours as needed for itching                                neomycin-polymyxin-dexamethasone 3.5-26792-5.1 Susp ophthalmic susp   Commonly known as:  MAXITROL   Place 1 drop Into the left eye 3 times daily                                tadalafil 20 MG tablet   Commonly known as:  CIALIS   Take 20 mg by mouth as needed

## 2017-03-13 NOTE — IP AVS SNAPSHOT
[facility]    [address] [place] [address]    Phone:  [phone]                                       After Visit Summary   3/13/2017    Jareth Hennessy    MRN: [id number]           After Visit Summary Signature Page     I have received my discharge instructions, and my questions have been answered. I have discussed any challenges I see with this plan with the nurse or doctor.    ..........................................................................................................................................  Patient/Patient Representative Signature      ..........................................................................................................................................  Patient Representative Print Name and Relationship to Patient    ..................................................               ................................................  Date                                            Time    ..........................................................................................................................................  Reviewed by Signature/Title    ...................................................              ..............................................  Date                                                            Time

## 2017-03-13 NOTE — ANESTHESIA CARE TRANSFER NOTE
Patient: Jareth Hennessy    Procedure(s):  Left upper eyelid ptosis repair - Wound Class: I-Clean    Diagnosis: Left congenital ptosis  Diagnosis Additional Information: No value filed.    Anesthesia Type:   MAC     Note:  Airway :Room Air  Patient transferred to:Phase II  Comments: To Phase II. Report to RN.  VSS Resp status stable.      Vitals: (Last set prior to Anesthesia Care Transfer)    CRNA VITALS  3/13/2017 0725 - 3/13/2017 0758      3/13/2017             Pulse: 68    SpO2: 100 %                Electronically Signed By: VALENTIN Jeronimo CRNA  March 13, 2017  7:58 AM

## 2017-03-13 NOTE — OP NOTE
PREOPERATIVE DIAGNOSIS: Left upper eyelid congenital ptosis.   POSTOPERATIVE DIAGNOSIS:  Left upper eyelid congenital ptosis.   PROCEDURE:Left upper eyelid ptosis repair by Rock's muscle conjunctival resection and superior tarsectomy. 10mm of conjunctiva and Anderson's + 2 mm of superior tarsectomy.  SURGEON: Carey Escobar MD  ASSISTANT: None  ANESTHESIA: Monitored with local infiltration of a 50/50 mixture of 2% lidocaine with epinephrine and 0.5% Marcaine.   COMPLICATIONS: None.   ESTIMATED BLOOD LOSS: 1 mL   HISTORY: Jareth Hennessy presented with upper lid ptosis interfering with the superior visual field and activities of daily living. After the risks, benefits and alternatives to the proposed procedure were explained, informed consent was obtained.   PROCEDURE: The patient was brought to the operating room and placed supine on the operating table. IV sedation was given. The left upper eyelid was infiltrated with local anesthetic and  was prepped and draped in the typical sterile ophthalmic fashion. Atttention was directed to the left  side.  A 4-0 silk suture was placed through the eyelid margin and the eyelid everted over a Desmarres retractor. A 6-0 silk suture was then threaded through the conjunctiva and Rock's muscle 5 mm from the superior tarsal border in order to excise a total of 10 muscle and conjunctiva. Additionally 2 mm of superior tarsus was marked out. These sutures were used to elevate the conjunctiva and Rock's muscle which was gently peeled from the underlying levator muscle. The Putterman clamp was used and clamped over the elevated tissues. A 6-0 plain gut suture was run in a horizontal mattress fashion 1 mm below the clamp from lateral to medial, then medial to lateral. The elevated tissues were excised with a 15 blade. The sutures were then externalized and tied in the lid crease. The 4-0 silk suture was removed. The lid was reverted to its normal position and ophthalmic  antibiotic ointment placed in the eye. The patient tolerated the procedure well and left the operating room in stable condition.   Carey Escobar MD

## 2017-03-13 NOTE — TELEPHONE ENCOUNTER
Returned phone call to pt, postop check with Dr Escobar scheduled for 4/12/17 scheduled.  Pt to call back clinic if further questions or concerns arise.  Lela Mays RN

## 2017-03-13 NOTE — DISCHARGE INSTRUCTIONS
Post-operative Instructions  Ophthalmic Plastic and Reconstructive Surgery    Carey Escobar M.D.     All instructions apply to the operated eye(s) or eyelid(s).    Wound care and personal care  ? If a patch or bandage has been placed, please leave this in place until seen by your physician. Ensure that the bandage does not get wet when you take a shower.  ? Apply ice compresses 15 minutes on 15 minutes off while awake for 2 days, then switch to warm water compresses 4 times a day until seen by your physician. For warm packs you can place a cup of dry uncooked rice in a clean cotton sock. Then place sock in microwave 30 seconds to one minute. Next place the warm sock into a plastic bag and wrap the bag with clean warm wet washcloth and place over operated eye.    ? You may shower or wash your hair the day after surgery. Do not bathe or go swimming for 1 week to prevent contamination of your wounds.  ? Do not apply make-up to the eyes or eyelids for 2 weeks after surgery.  ? Expect some swelling, bruising, black eye (even into the lower eyelids and cheeks). Also expect serum caking, crusting and discharge from the eye and/or incisions. A small amount of surface bleeding is normal for the first 48 hours.  ? Your eye(s) and eyelid(s) may be painful and tender. This is normal after surgery.  Use the pain medication as prescribed. If your pain does not improve despite the  medication, contact the office.    Contact information and follow-up  ? Return to the Eye Clinic for a follow-up appointment with your physician as  scheduled. If no appointment has been scheduled:   - [facility] eye clinic: [phone] for an appointment with Dr. Escobar within 1 to 2 weeks from your date of surgery.   -  [facility] eye clinic: [phone] for an appointment with Dr. Escobar within 1 to 2 weeks from your date of surgery.     ? For severe pain, bleeding, or loss of vision, call the [facility]  [facility] at [phone] or [facility] at [phone].     After hours or on weekends and holidays, call [phone] and ask to speak with the ophthalmologist on call.    An on call person can be reached after hours for concerns. The on call doctor should not call in medication refill requests after hours or on weekends, so please plan accordingly. An effort has been made to provide adequate pain medications following every surgery, and refills will not be provided in most instances. Narcotic pain medications cannot be called in.     Activity restrictions and driving  ? Avoid heavy lifting, bending, exercise or strenuous activity for 1 week after surgery.  You may resume other activities and return to work as tolerated.  ? You may not resume driving until have you stopped using narcotic pain medications (such as Norco, Percocet, Tylenol #3).    Medications  ? Restart all your regular home medications and eye drops. If you take Plavix or  Aspirin on a regular basis, wait for 72 hours after your surgery before restarting these in order to decrease the risk of bleeding complications.  ? Avoid aspirin and aspirin-like medications (Motrin, Aleve, Ibuprofen, [place]-  [place] etc) for 72 hours to reduce the risk of bleeding. You may take Tylenol  (acetaminophen) for pain.  ? In addition to your home medications, take the following post-operative medications as prescribed by your physician.    ? Apply antibiotic ointment to the suture on the left upper eyelid, and a half inch strip into the operated eye at night.  ? Instill eye drops 3 times a day for 10 days.   ? Take pain pills at prescribed frequency as needed for pain.    ? WARNING: All the prescription pain medications listed above contain Tylenol  (acetaminophen). You must not take more than 4,000 mg of acetaminophen per  24-hour period. This is equivalent to 6 tablets of Darvocet, 12 tablets of Norco, Percocet or Tylenol #3. If you take  other over-the-counter medications containing acetaminophen, you must take the amount of acetaminophen into account and reduce the number of prescribed pain pills accordingly.  ? The prescribed medications may make you drowsy. You must not drive a car,  operate heavy machinery or drink alcohol while taking them.  ? The prescribed pain medications may cause constipation and nausea. Take them  with some food to prevent a stomach upset. If you continue to experience nausea,  call your physician.    [facility]  Same-Day Surgery   Adult Discharge Orders & Instructions   For 24 hours after surgery  1. Get plenty of rest.  A responsible adult must stay with you for at least 24 hours after you leave the hospital.   2. Do not drive or use heavy equipment.  If you have weakness or tingling, don't drive or use heavy equipment until this feeling goes away.  3. Do not drink alcohol.  4. Avoid strenuous or risky activities.  Ask for help when climbing stairs.   5. You may feel lightheaded.  IF so, sit for a few minutes before standing.  Have someone help you get up.   6. If you have nausea (feel sick to your stomach): Drink only clear liquids such as apple juice, ginger ale, broth or 7-Up.  Rest may also help.  Be sure to drink enough fluids.  Move to a regular diet as you feel able.  7. You may have a slight fever. Call the doctor if your fever is over 100 F (37.7 C) (taken under the tongue) or lasts longer than 24 hours.  8. You may have a dry mouth, a sore throat, muscle aches or trouble sleeping.  These should go away after 24 hours.  9. Do not make important or legal decisions.   Call your doctor for any of the followin.  Signs of infection (fever, growing tenderness at the surgery site, a large amount of drainage or bleeding, severe pain, foul-smelling drainage, redness, swelling).    2. It has been over 8 to 10 hours since surgery and you are still not able to urinate (pass water).    3.   Headache for over 24 hours.    4.  Numbness, tingling or weakness the day after surgery (if you had spinal anesthesia).  To contact a doctor, call ____[phone]_______________________

## 2017-03-13 NOTE — TELEPHONE ENCOUNTER
[facility] Call Center    Phone Message    Name of Caller: TEQUILA    Phone Number: Cell number on file:    Telephone Information:   Mobile [phone]       Best time to return call: Any    May a detailed message be left on voicemail: yes    Relation to patient: Self    Reason for Call: Other: pt has concerns related to the eye surgery completed today 03/13/2017. unsure what needs to be done from follow up. please advise.     Action Taken: Message routed to:  Adult Clinics: Eye p [phone]

## 2017-03-15 ENCOUNTER — MEDICAL CORRESPONDENCE (OUTPATIENT)
Dept: HEALTH INFORMATION MANAGEMENT | Facility: CLINIC | Age: 47
End: 2017-03-15

## 2017-04-12 ENCOUNTER — OFFICE VISIT (OUTPATIENT)
Dept: OPHTHALMOLOGY | Facility: CLINIC | Age: 47
End: 2017-04-12
Payer: COMMERCIAL

## 2017-04-12 DIAGNOSIS — H02.422 MYOGENIC PTOSIS, LEFT: Primary | ICD-10-CM

## 2017-04-12 PROCEDURE — 99024 POSTOP FOLLOW-UP VISIT: CPT | Performed by: OPHTHALMOLOGY

## 2017-04-12 ASSESSMENT — VISUAL ACUITY
OD_SC: 20/20
OS_SC: 20/20
METHOD: SNELLEN - LINEAR

## 2017-04-12 NOTE — MR AVS SNAPSHOT
After Visit Summary   4/12/2017    Jareth Hennessy    MRN: [id number]           Patient Information     Date Of Birth          1970        Visit Information        Provider Department      4/12/2017 8:30 AM Carey Escobar MD [facility]        Care Instructions    Continue the ointment that you were given, if it is gone you can use vaseline or aquaphor ointment to the incision only, not inside of your eye.  Continue warms soak twice daily to help with the bruising.  If you were given a steroid eye drop to use you should stop that after 10 days.    If your eyes feel dry use Artificial tears to keep them lubricated    Return in 2 months            Follow-ups after your visit        Your next 10 appointments already scheduled     Jun 14, 2017  7:30 AM CDT   Return Visit with Carey Escobar MD   [facility] ([facility])    [id number] [place] [address] [id number] [phone]              Who to contact     If you have questions or need follow up information about today's clinic visit or your schedule please contact [facility] directly at [phone].  Normal or non-critical lab and imaging results will be communicated to you by MyChart, letter or phone within 4 business days after the clinic has received the results. If you do not hear from us within 7 days, please contact the clinic through MyChart or phone. If you have a critical or abnormal lab result, we will notify you by phone as soon as possible.  Submit refill requests through [employer] or call your pharmacy and they will forward the refill request to us. Please allow 3 business days for your refill to be completed.          Additional Information About Your Visit        [employer] Information     [employer] is an electronic gateway that provides easy, online access to your medical records. With [employer], you can request a clinic appointment, read your test results,  renew a prescription or communicate with your care team.     To sign up for MyChart visit the website at [email or web address]   You will be asked to enter the access code listed below, as well as some personal information. Please follow the directions to create your username and password.     Your access code is: [email or web address]  Expires: 2017 10:19 AM     Your access code will  in 90 days. If you need help or a new code, please contact your [facility] or call [phone] for assistance.        Care EveryWhere ID     This is your Care EveryWhere ID. This could be used by other organizations to access your [facility] medical records  [id number]         Blood Pressure from Last 3 Encounters:   17 105/60   17 110/51    Weight from Last 3 Encounters:   17 85.1 kg (187 lb 9.8 oz)              Today, you had the following     No orders found for display       Primary Care Provider Office Phone # Fax #    Lopez Camara [phone]       [facility] [address] [facility] [address]        Thank you!     Thank you for choosing [facility]  for your care. Our goal is always to provide you with excellent care. Hearing back from our patients is one way we can continue to improve our services. Please take a few minutes to complete the written survey that you may receive in the mail after your visit with us. Thank you!             Your Updated Medication List - Protect others around you: Learn how to safely use, store and throw away your medicines at www.disposemymeds.org.          This list is accurate as of: 17  8:36 AM.  Always use your most recent med list.                   Brand Name Dispense Instructions for use    acetaminophen 325 MG tablet    TYLENOL    100 tablet    Take 2 tablets (650 mg) by mouth every 4 hours as needed for other (surgical pain)       erythromycin ophthalmic ointment    ROMYCIN    3.5 g     Apply small amount to left upper eyelid suture and into operated eye at bedtime.       glucosamine chondroitinoitin complex Caps      Take 1 tablet by mouth daily       HYDROcodone-acetaminophen 5-325 MG per tablet    NORCO    10 tablet    Take 1 tablet by mouth every 6 hours as needed for pain Maximum of 4000 mg of acetaminophen in 24 hours.       hydrOXYzine 25 MG tablet    ATARAX    25 tablet    Take 1 tablet (25 mg) by mouth every 6 hours as needed for itching       neomycin-polymyxin-dexamethasone 3.5-12294-2.1 Susp ophthalmic susp    MAXITROL    1 Bottle    Place 1 drop Into the left eye 3 times daily       tadalafil 20 MG tablet    CIALIS     Take 20 mg by mouth as needed

## 2017-04-12 NOTE — PROGRESS NOTES
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 SEGAL MUSCLE CONJUNCTIVAL RESECTION  + superior tarsectomy  Still moderate edema.  About 1.5 mm lower than right, but still edematous.    Warm soaks.  F/u 2 months.    Complete documentation of historical and exam elements from today's encounter can be found in the full encounter summary report (not reduplicated in this progress note). I personally obtained the chief complaint(s) and history of present illness.  I confirmed and edited as necessary the review of systems, past medical/surgical history, family history, social history, and examination findings as documented by others; and I examined the patient myself. I personally reviewed the relevant tests, images, and reports as documented above. I formulated and edited as necessary the assessment and plan and discussed the findings and management plan with the patient and family. - Carey Escobar MD

## 2017-04-12 NOTE — PATIENT INSTRUCTIONS
Continue the ointment that you were given, if it is gone you can use vaseline or aquaphor ointment to the incision only, not inside of your eye.  Continue warms soak twice daily to help with the bruising.  If you were given a steroid eye drop to use you should stop that after 10 days.    If your eyes feel dry use Artificial tears to keep them lubricated    Return in 2 months

## 2017-04-12 NOTE — NURSING NOTE
Patient presents with:  Post Op (Ophthalmology) Left Eye: Left upper eyelid ptosis repair 3/13/17      Referring Provider:  No referring provider defined for this encounter.    HPI    Affected eye(s):  Left   Location:  Lower   Symptoms:           Do you have eye pain now?:  No      Comments:     Post Op (Ophthalmology) Left Eye: Left upper eyelid ptosis repair 3/13/17. Still swollen

## 2017-06-14 ENCOUNTER — OFFICE VISIT (OUTPATIENT)
Dept: OPHTHALMOLOGY | Facility: CLINIC | Age: 47
End: 2017-06-14
Payer: COMMERCIAL

## 2017-06-14 DIAGNOSIS — Q10.0 PTOSIS, CONGENITAL, LEFT: Primary | ICD-10-CM

## 2017-06-14 PROCEDURE — 99024 POSTOP FOLLOW-UP VISIT: CPT | Performed by: OPHTHALMOLOGY

## 2017-06-14 ASSESSMENT — SLIT LAMP EXAM - LIDS: COMMENTS: NORMAL

## 2017-06-14 ASSESSMENT — LAGOPHTHALMOS
OD_LAGOPHTHALMOS: 0
OS_LAGOPHTHALMOS: 1

## 2017-06-14 ASSESSMENT — VISUAL ACUITY
METHOD: SNELLEN - LINEAR
OD_SC: 20/20
OD_SC+: -1
OS_SC: 20/20
OS_SC+: -1

## 2017-06-14 ASSESSMENT — MARGIN REFLEX DISTANCE
OD_MRD1: 4
OS_MRD1: 4

## 2017-06-14 NOTE — NURSING NOTE
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 SEGAL MUSCLE CONJUNCTIVAL RESECTION  + superior tarsectomy  4/12/17      Referring Provider:  No referring provider defined for this encounter.    HPI    Affected eye(s):  Left   Symptoms:     Tearing            Comments:  Pt feels that the temporal lid is a little low. Pt denies any gtts or ointment.

## 2017-06-14 NOTE — MR AVS SNAPSHOT
After Visit Summary   2017    Jareth Hennessy    MRN: [id number]           Patient Information     Date Of Birth          1970        Visit Information        Provider Department      2017 1:30 PM Carey Escobar MD [facility]        Today's Diagnoses     Ptosis, congenital, left    -  1       Follow-ups after your visit        Who to contact     If you have questions or need follow up information about today's clinic visit or your schedule please contact [facility] directly at [phone].  Normal or non-critical lab and imaging results will be communicated to you by MyChart, letter or phone within 4 business days after the clinic has received the results. If you do not hear from us within 7 days, please contact the clinic through [employer] or phone. If you have a critical or abnormal lab result, we will notify you by phone as soon as possible.  Submit refill requests through [employer] or call your pharmacy and they will forward the refill request to us. Please allow 3 business days for your refill to be completed.          Additional Information About Your Visit        MyChart Information     [employer] is an electronic gateway that provides easy, online access to your medical records. With [employer], you can request a clinic appointment, read your test results, renew a prescription or communicate with your care team.     To sign up for [employer] visit the website at [email or web address]   You will be asked to enter the access code listed below, as well as some personal information. Please follow the directions to create your username and password.     Your access code is: [email or web address]  Expires: 2017  1:56 PM     Your access code will  in 90 days. If you need help or a new code, please contact your [facility] or call [phone] for assistance.        Care EveryWhere ID     This is your Care EveryWhere ID.  This could be used by other organizations to access your [place] medical records  [id number]         Blood Pressure from Last 3 Encounters:   03/13/17 105/60   01/13/17 110/51    Weight from Last 3 Encounters:   01/11/17 85.1 kg (187 lb 9.8 oz)              Today, you had the following     No orders found for display       Primary Care Provider Office Phone # Fax #    Lopez Camara [phone]       [facility] [address] [facility] [address]        Thank you!     Thank you for choosing [facility]  for your care. Our goal is always to provide you with excellent care. Hearing back from our patients is one way we can continue to improve our services. Please take a few minutes to complete the written survey that you may receive in the mail after your visit with us. Thank you!             Your Updated Medication List - Protect others around you: Learn how to safely use, store and throw away your medicines at www.disposemymeds.org.          This list is accurate as of: 6/14/17  1:56 PM.  Always use your most recent med list.                   Brand Name Dispense Instructions for use    acetaminophen 325 MG tablet    TYLENOL    100 tablet    Take 2 tablets (650 mg) by mouth every 4 hours as needed for other (surgical pain)       glucosamine chondroitinoitin complex Caps      Take 1 tablet by mouth daily       HYDROcodone-acetaminophen 5-325 MG per tablet    NORCO    10 tablet    Take 1 tablet by mouth every 6 hours as needed for pain Maximum of 4000 mg of acetaminophen in 24 hours.       hydrOXYzine 25 MG tablet    ATARAX    25 tablet    Take 1 tablet (25 mg) by mouth every 6 hours as needed for itching       tadalafil 20 MG tablet    CIALIS     Take 20 mg by mouth as needed

## 2017-06-14 NOTE — PROGRESS NOTES
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 SEGAL MUSCLE CONJUNCTIVAL RESECTION  + superior tarsectomy  4/12/17     Feels height is good, but the eyelid looks \"fatter\" than the other side. He thought that was the case before surgery as well. No pain. He does occasionally water from that eye.         Assessment & Plan     Jareth Hennessy is a 46 year old male with the following diagnoses:   1. Ptosis, congenital, left     S/p segal muscle conjunctival resection plus superior tarsectomy.   Good symmetry with central height, laterally slightly lower than right, and there is a component of lash ptosis. At this point I recommend observation as the asymmetry is quite mild.     F/u with me as needed        Complete documentation of historical and exam elements from today's encounter can be found in the full encounter summary report (not reduplicated in this progress note). I personally obtained the chief complaint(s) and history of present illness.  I confirmed and edited as necessary the review of systems, past medical/surgical history, family history, social history, and examination findings as documented by others; and I examined the patient myself. I personally reviewed the relevant tests, images, and reports as documented above. I formulated and edited as necessary the assessment and plan and discussed the findings and management plan with the patient and family. - Carey Escobar MD    "

## (undated) DEVICE — GLOVE PROTEXIS W/NEU-THERA 7.5  2D73TE75

## (undated) DEVICE — NDL 30GA 0.5" 305106

## (undated) DEVICE — SOL NACL 0.9% IRRIG 1000ML BOTTLE 07138-09

## (undated) DEVICE — SYR 03ML LL W/O NDL

## (undated) DEVICE — PACK MINOR EYE

## (undated) DEVICE — BLADE KNIFE SURG 15 371115

## (undated) DEVICE — MARKER SKIN DOUBLE TIP W/FLEXI-RULER W/LABELS

## (undated) DEVICE — ESU ELEC NDL 1" COATED/INSULATED E1465

## (undated) DEVICE — ESU PENCIL W/HOLSTER

## (undated) DEVICE — ESU EYE HIGH TEMP 65410-183